# Patient Record
(demographics unavailable — no encounter records)

---

## 2017-03-22 NOTE — UC
Shoulder Pain HPI





- HPI Summary


HPI Summary: 





complaint of right shoulder pain that started approx 3 weeks ago


can't recall any trauma


 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


pain is in deltoid and shoots back into scalpula


pain is only elicited with movements


not taking any medications for pain








- History of Current Complaint


Chief Complaint: UCUpperExtremity


Stated Complaint: SHOULDER INJURY


Time Seen by Provider: 03/22/17 17:42


Hx Obtained From: Patient


Timing: Minutes


Character: Aching


Aggravating Factor(s): Lifting


Alleviating Factor(s): Rest





- Allergies/Home Medications


Allergies/Adverse Reactions: 


 Allergies











Allergy/AdvReac Type Severity Reaction Status Date / Time


 


No Known Allergies Allergy   Verified 03/22/17 17:11











Home Medications: 


 Home Medications





NK [No Home Medications Reported]  03/22/17 [History Confirmed 03/22/17]











PMH/Surg Hx/FS Hx/Imm Hx


Previously Healthy: Yes


Endocrine History Of: 


   Denies: Diabetes, Thyroid Disease


Cardiovascular History Of: Reports: Cardiac Disorders - HEART MURMUR


   Denies: Hypertension


Respiratory History Of: 


   Denies: COPD, Asthma


GI/ History Of: 


   Denies: Ulcer


Psychological History Of: Reports: Anxiety - hx of cutting





- Surgical History


Surgical History: Yes


Surgery Procedure, Year, and Place: right wrist





- Family History


Known Family History: Positive: Hypertension


   Negative: Cardiac Disease, Diabetes





- Social History


Occupation: Employed Full-time


Lives: With Family


Alcohol Use: Occasionally


Substance Use Type: Marijuana


Smoking Status (MU): Never Smoked Tobacco


Have You Smoked in the Last Year: No





Review of Systems


Constitutional: Negative


Skin: Negative


Eyes: Negative


ENT: Negative


Respiratory: Negative


Cardiovascular: Negative


Gastrointestinal: Negative


Genitourinary: Negative


Motor: Negative


Neurovascular: Negative


Musculoskeletal: Other: - rioght shoulder pain


Neurological: Negative


Psychological: Negative


All Other Systems Reviewed And Are Negative: Yes





Physical Exam


Triage Information Reviewed: Yes


Appearance: No Pain Distress, Well-Nourished


Vital Signs: 


 Initial Vital Signs











Temp  97.6 F   03/22/17 17:07


 


Pulse  50   03/22/17 17:07


 


Resp  12   03/22/17 17:07


 


BP  125/70   03/22/17 17:07


 


Pulse Ox  100   03/22/17 17:07











Vital Signs Reviewed: Yes


Eyes: Positive: Conjunctiva Clear


ENT: Positive: Normal ENT inspection


Neck: Positive: No Lymphadenopathy


Respiratory: Positive: Lungs clear, Normal breath sounds, No respiratory 

distress, No accessory muscle use


Cardiovascular: Positive: RRR, No Murmur, Pulses Normal


Musculoskeletal: Negative: Other: - RUE- shoulder full ROM of arm- pain 

elicitied in shoulder at 90 degrees no tenderness elicited from palpation of 

structures/musculature of shoulder negative for inpingment


Neurological: Positive: Alert


Psychological Exam: Normal


Skin Exam: Normal





Shoulder Course/Dx





- Differential Dx/Diagnosis


Differential Diagnosis/HQI/PQRI: Rotator Cuff Injury, Sprain, Strain


Provider Diagnoses: right shoulder pain





Discharge





- Discharge Plan


Condition: Stable


Disposition: HOME


Patient Education Materials:  Rotator Cuff Injury (ED), Shoulder Sprain (ED), 

RICE Therapy (ED)


Referrals: 


No Primary Care Phys,NOPCP [Primary Care Provider] - 


[facility] [place] [facility] [Outside]


Additional Instructions: 


Please call physical therapy for further evaluation and treatment.


Take ibuprofen for  pain and to reduce inflammation


rest your shoulder apply ice twice a day


 Increase fluids and rest. 


Please review your discharge instructions.


 If your symptoms do not improve please call your primary care provider or 

return to urgent care.

## 2017-05-25 NOTE — ED
margaux HUMMEL Timothy, scribed for Dustin Tapia MD on 05/25/17 at 0732 .





Lower Extremity





- HPI Summary


HPI Summary: 





Garry Trujillo III is a 26 yo male presenting to [facility] with 10/10 pain in his 

right ankle secondary to falling down stairs while intoxicated at 2300 last 

night. His pain increases with palpation or any movement. His MH includes heart 

murmur, self-harm, anxiety, substance abuse.





- History of Current Complaint


Chief Complaint: EDExtremityLower


Stated Complaint: RIGHT ANKLE INJURY


Time Seen by Provider: 05/25/17 07:23


Hx Obtained From: Patient


Mechanism Of Injury: Fall From A Standing Position - down stairs


Onset of Pain: Immediate


Onset/Duration: Hours


Pain Intensity: 10


Pain Scale Used: 0-10 Numeric


Location: Is Discrete @ - right ankle


Associated Signs And Symptoms: Positive: Swelling, Bruising


Aggravating Factor(s): Standing, Ambulation, Movement, Weight Bearing





- Allergies/Home Medications


Allergies/Adverse Reactions: 


 Allergies











Allergy/AdvReac Type Severity Reaction Status Date / Time


 


No Known Allergies Allergy   Verified 03/22/17 17:11














PMH/Surg Hx/FS Hx/Imm Hx


Endocrine/Hematology History: 


   Denies: Hx Diabetes, Hx Thyroid Disease


Cardiovascular History: 


   Denies: Hx Hypertension


Respiratory History: 


   Denies: Hx Asthma, Hx Chronic Obstructive Pulmonary Disease (COPD)


GI History: 


   Denies: Hx Ulcer


Psychiatric History: Reports: Hx Anxiety - hx of cutting, Hx Substance Abuse





- Cancer History


Cancer Type, Location and Year: anxiety





- Surgical History


Surgery Procedure, Year, and Place: right wrist


Infectious Disease History: No


Infectious Disease History: 


   Denies: Hx Clostridium Difficile, Hx Hepatitis, Hx Human Immunodeficiency 

Virus (HIV), Hx of Known/Suspected MRSA, Hx Shingles, Hx Tuberculosis, Hx Known/

Suspected VRE, Hx Known/Suspected VRSA, History Other Infectious Disease, 

Traveled Outside the [place] in Last 30 Days





- Family History


Known Family History: Positive: Hypertension


   Negative: Cardiac Disease, Diabetes





- Social History


Alcohol Use: Occasionally


Hx Substance Use: Yes


Substance Use Type: Reports: Marijuana


Smoking Status (MU): Never Smoked Tobacco


Have You Smoked in the Last Year: No





Review of Systems


Constitutional: Negative


Eyes: Negative


ENT: Negative


Cardiovascular: Negative


Respiratory: Negative


Gastrointestinal: Negative


Genitourinary: Negative


Musculoskeletal: Other - right ankle pain


Positive: Bruising - right ankle


Neurological: Negative


Psychological: Normal


All Other Systems Reviewed And Are Negative: Yes





Physical Exam





- Summary


Physical Exam Summary: 





VITAL SIGNS: Reviewed. 


GENERAL:  Patient is a well-developed and nourished male who is lying 

comfortable in the stretcher.  Patient is not in any acute respiratory 

distress. 


HEAD AND FACE: No signs of trauma.  No ecchymosis, hematomas or skull 

depressions. No sinus tenderness. 


EYES: PERRLA, EOMI x 2, No injected conjunctiva, no nystagmus.


EARS: Hearing grossly intact. Ear canals and tympanic membranes are within 

normal limits. 


MOUTH: Oropharynx within normal limits. 


NECK: Supple, trachea is midline, no adenopathy, no JVD, no carotid bruit, no c-

spine tenderness, neck with full ROM.


CHEST: Symmetric, no tenderness at palpation 


LUNGS: Clear to auscultation bilaterally. No wheezing or crackles.


CVS: Regular rate and rhythm, S1 and S2 present, no murmurs or gallops 

appreciated. 


ABDOMEN: Soft, non-tender. No signs of distention. No rebound no guarding, and 

no masses palpated. Bowel sounds are normal. 


EXTREMITIES: FROM in all major joints, no cyanosis or clubbing. In right ankle 

there is positive edema and ecchymosis with tenderness at the lateral malleolus


NEURO: Alert and oriented x 3. No acute neurological deficits. Speech is normal 

and follows commands.


SKIN: Dry and warm





Triage Information Reviewed: Yes


Vital Signs On Initial Exam: 


 Initial Vitals











Temp Pulse Resp BP Pulse Ox


 


 99.2 F   88   16   151/70   97 


 


 05/25/17 07:09  05/25/17 07:09  05/25/17 07:09  05/25/17 07:09  05/25/17 07:09











Vital Signs Reviewed: Yes





Procedures





- Procedure Summary


Procedure Summary: 





Cast was created and applied to RLE. Pt tolerated procedure well.





- Splinting


Location: RLE - ankle


Hand-Made Type: fiberglass


Splint: posterior


Pre-Proc Neuro Vasc Exam: normal


Post-Proc Neuro Vasc Exam: normal





Diagnostics





- Vital Signs


 Vital Signs











  Temp Pulse Resp BP Pulse Ox


 


 05/25/17 07:09  99.2 F  88  16  151/70  97














- Laboratory


Lab Statement: Any lab studies that have been ordered have been reviewed, and 

results considered in the medical decision making process.





- Radiology


  ** R Ankle XR


Xray Interpretation: Positive (See Comments) - IMPRESSION:  LONGITUDINALLY 

ORIENTED NONDISPLACED FRACTURE OF THE MEDIAL DISTAL TIBIA WITH ARTICULAR 

EXTENSION


Radiology Interpretation Completed By: Radiologist





  ** R Foot XR


Xray Interpretation: Positive (See Comments) - IMPRESSION:  AGAIN NOTED IS A 

NONDISPLACED FRACTURE OF THE MEDIAL DISTAL TIBIA


Radiology Interpretation Completed By: Radiologist





Re-Evaluation





- Re-Evaluation


  ** First Eval


Re-Evaluation Time: 08:19


Change: Unchanged


Comment: Discussed imaging results with Pt and necessity of a cast. Pt is 

agreeable to the course of Tx.





Lower Extremity Course/Dx





- Course


Assessment/Plan: Garry Trujillo III is a 26 yo male presenting to [facility] with 10

/10 R ankle pain after falling down the stairs at 2300 last night while 

intoxicated.  Ankle X ray impression: Nondisplaced fracture of the medial 

distal tibia.  Foot x ray impression: Longitudinal Nondisplaced fracture of the 

medial distal tibia with articular extension.  In the ED course he was given 

Toradol for the pain. Reports feeling better.  I place a posterior fibreglass 

splint. After splint patient is neurovascularly intact.  He will be given 

Crutches for a non weight bearing status. He will f/u with orthopedics.  I 

discussed all the findings and test results with the patient. Patient was 

instructed to return to the emergency room immediately if any of the symptoms 

return or worsens. Plan of care was discussed with the patient and understands 

and agrees. All questions were answered at patient satisfaction.  There were no 

further complaints or concerns.  Lung exam before discharge: CTA B/L. Good air 

exchange. No wheezing or crackles heard. CVS: S1 and S2 present. No murmurs 

appreciated. Patient is alert and oriented x 3. Patient is hemodynamically 

stable. Patient will be discharged home with follow up PCP in the next 2-3 days





- Diagnoses


Differential Diagnosis/HQI/PQRI: Positive: Fracture (Closed), Sprain, Strain


Provider Diagnoses: 


 Nondisplaced fracture of right tibia








Discharge





- Discharge Plan


Condition: Stable


Disposition: HOME


Patient Education Materials:  Leg Fracture (ED), Cast Care (ED)


Referrals: 


[facility] [place] [facility] [Outside] - 2 Days


Дмитрий Soria MD [Medical Doctor] - 2 Days


Additional Instructions: 


Please follow up with the primary care physician and the orthopedist provided 

regarding your visit to the emergency department today. Return to the emergency 

department with any new or recurring symptoms.





The documentation as recorded by the margaux downey Timothy accurately 

reflects the service I personally performed and the decisions made by me, Dustin Tapia MD.

## 2017-05-25 NOTE — RAD
HISTORY: Right ankle pain, trauma



COMPARISONS: None



VIEWS: There is, Frontal, lateral, and oblique views of the right ankle



FINDINGS:



BONE DENSITY: Normal.

BONES: There is a longitudinally oriented nondisplaced fracture of the medial distal

tibial metaphysis with articular extension    

JOINTS: There is no arthropathy.    

ALIGNMENT: There is no dislocation. 

SOFT TISSUES: There is soft tissue swelling



OTHER FINDINGS: None.



IMPRESSION: 

LONGITUDINALLY ORIENTED NONDISPLACED FRACTURE OF THE MEDIAL DISTAL TIBIA WITH ARTICULAR

EXTENSION

## 2017-05-25 NOTE — RAD
HISTORY: Ankle and foot pain, trauma to right ankle and foot



COMPARISONS: Right ankle dated May 25, 2017



VIEWS: 3, Frontal, lateral, and oblique views of the right foot



FINDINGS:



BONE DENSITY: Normal.

BONES: Again noted is a nondisplaced fracture of the medial tibia with articular extension

   

JOINTS: There is no arthropathy.    

ALIGNMENT: There is no dislocation. 

SOFT TISSUES: Unremarkable.



OTHER FINDINGS: None.



IMPRESSION: 

AGAIN NOTED IS A NONDISPLACED FRACTURE OF THE MEDIAL DISTAL TIBIA

## 2017-08-12 NOTE — UC
Regina HUMMEL Alfonso, scribed for Gm Dodd MD on 08/12/17 at 1952 .





 Dental HPI





- HPI Summary


HPI Summary: 





This patient is a 25 year old M presenting to [facility] with a chief complaint of 

lower right gum swelling since 3 days ago. He states it is swollen and it 

feels like something is in there. The patient rates the throbbing pain 10/10 

in severity. Symptoms aggravated and alleviated by nothing. Patient denies fever

, ear pain, face swelling, and neck swelling. He recently broke his tibia and 

has crutches. 





- History of Current Complaint


Chief Complaint: UCDentalProblem


Stated Complaint: DENTAL (GUM) ISSUE


Time Seen by Provider: 08/12/17 19:43


Hx Obtained From: Patient


Onset/Duration: Sudden Onset, Lasting Days - 3, Still Present


Severity: Moderate


Pain Intensity: 10


Pain Scale Used: 0-10 Numeric


Aggravating: Nothing


Alleviating: Nothing


Related History: Swelling





- Allergies/Home Medications


Allergies/Adverse Reactions: 


 Allergies











Allergy/AdvReac Type Severity Reaction Status Date / Time


 


No Known Allergies Allergy   Verified 03/22/17 17:11














PMH/Surg Hx/FS Hx/Imm Hx


Other Respiratory History: No Asthma.





- Surgical History


Surgical History: Yes


Surgery Procedure, Year, and Place: right wrist





- Family History


Known Family History: Positive: Hypertension


   Negative: Cardiac Disease, Diabetes





- Social History


Alcohol Use: None


Substance Use Type: None


Smoking Status (MU): Never Smoked Tobacco


Have You Smoked in the Last Year: No





Review of Systems


Constitutional: Other - Negative fever


ENT: Other - Positive right gum swelling; negative ear pain, face swelling, and 

neck swelling.


All Other Systems Reviewed And Are Negative: Yes





Physical Exam


Triage Information Reviewed: Yes


Vital Signs: 


 Initial Vital Signs











Temp  98.4 F   08/12/17 19:30


 


Pulse  69   08/12/17 19:30


 


Resp  18   08/12/17 19:30


 


BP  130/72   08/12/17 19:30


 


Pulse Ox  100   08/12/17 19:30











Vital Signs Reviewed: Yes





- Additional Comments





The patient is well-nourished in no acute distress and in no acute pain.





The skin is warm and dry and skin color reflects adequate perfusion.





HEENT:  The head is normocephalic and atraumatic. The pupils are equal and 

reactive. The conjunctivae are clear and without drainage.  Nares are patent 

and without drainage.  Mouth reveals moist mucous membranes and the throat is 

without erythema and exudate.  The external ears are intact. The ear canals are 

patent and without drainage. The tympanic membranes are intact.





Dental : Tender to percussion of the gum line. Behind tooth 32 is excess gum 

tissue. Area of erythematous gums looking like gingivitis.





Neck is supple with full range of motion and non-tender. There are no carotid 

bruits.  There is no neck vein distension.





Respiratory: Chest is non-tender.  Lungs are clear to auscultation and breath 

sounds are symmetrical and equal.





Cardiovascular: Heart is regular rate and rhythm.  There is no murmur or rub 

auscultated.   There is no peripheral edema and pulses are symmetrical and 

equal.





Abdomen: The abdomen is soft and non-tender.  There are normal bowel sounds 

heard in all four quadrants and there is no organomegaly palpated.





Musculoskeletal: There is no back pain noted.  Extremities are non-tender with 

full range of motion.  There is good capillary refill.  There is no peripheral 

edema or calf tenderness elicited.





Neurological: Patient is alert and oriented to person, place and time.  The 

patient has symmetrical motor strength in all four extremities.  Cranial nerves 

are grossly intact.





Psychiatric: The patient has an appropriate affect and does not exhibit any 

anxiety or depression.





Dental Complaint Course/Dx





- Course


Course Of Treatment: This patient is a 25 year old M presenting to [facility] with a 

chief complaint of lower right gum swelling since 3 days ago. He states it is 

swollen and it feels like something is in there. The patient rates the 

throbbing pain 10/10 in severity. Symptoms aggravated and alleviated by 

nothing. Patient denies fever, ear pain, face swelling, and neck swelling. He 

recently broke his tibia and has crutches. Patient will be discharged with 

prescription for Augmentin and follow up from PCP and dentist. The patient is 

agreeable with this plan.





- Differential Dx/Diagnosis


Differential Diagnosis/Dx: Dental Abscess, Gingivitis


Provider Diagnoses: Gingivitis





Discharge





- Discharge Plan


Condition: Stable


Disposition: HOME


Prescriptions: 


Amoxicillin/Clavulanate TAB* [Augmentin *] 875 mg PO BID #12 tab


Patient Education Materials:  Gingivitis (ED)


Referrals: 


Benny Kc MD [Primary Care Provider] - 3 Days


Additional Instructions: 


FOLLOW UP WITH A DENTIST IF YOUR SYMPTOMS DO NOT IMPROVE OR BECOME WORSE.





The documentation as recorded by the Regina downey Alfonso accurately reflects 

the service I personally performed and the decisions made by me, Gm Dodd MD.

## 2017-08-13 NOTE — ED
I, Oh,Sean, scribed for Toro Brownlee MD on 08/13/17 at 0259 .





Lower Extremity





- HPI Summary


HPI Summary: 





This 24 y/o male presents to ED for recurrent RLE leg pain tonight. PMHx is 

significant for RLE fracture s/p cast 6-7 days ago. He fell from standing 

position while ambulating with crutches, and decided to visit ED again when he 

became concerned about possible re-fracture of his RLE leg. He is able to 

tolerate ambulation with crutches, and seen ambulating to ED room. Other PMHx 

includes heart murmur. Pt is currently employed at [employer]. 





- History of Current Complaint


Chief Complaint: EDExtremityLower


Stated Complaint: FELL ON BROKEN RT LEG


Time Seen by Provider: 08/13/17 02:29


Hx Obtained From: Patient


Mechanism Of Injury: Fall From A Standing Position


Onset of Pain: Immediate


Pain Intensity: 5


Pain Scale Used: 0-10 Numeric


Location: Is Discrete @ - RLE leg


Character Of Pain: Dull


Associated Signs And Symptoms: Positive: Negative


Aggravating Factor(s): Standing, Ambulation


Alleviating Factor(s): Rest





- Allergies/Home Medications


Allergies/Adverse Reactions: 


 Allergies











Allergy/AdvReac Type Severity Reaction Status Date / Time


 


No Known Allergies Allergy   Verified 08/13/17 02:14














PMH/Surg Hx/FS Hx/Imm Hx


Endocrine/Hematology History: 


   Denies: Hx Diabetes, Hx Thyroid Disease


Cardiovascular History: 


   Denies: Hx Hypertension


Respiratory History: 


   Denies: Hx Asthma, Hx Chronic Obstructive Pulmonary Disease (COPD)


GI History: 


   Denies: Hx Ulcer


Psychiatric History: Reports: Hx Anxiety - hx of cutting, Hx Substance Abuse





- Cancer History


Cancer Type, Location and Year: anxiety





- Surgical History


Surgery Procedure, Year, and Place: right wrist


Infectious Disease History: No


Infectious Disease History: 


   Denies: Hx Clostridium Difficile, Hx Hepatitis, Hx Human Immunodeficiency 

Virus (HIV), Hx of Known/Suspected MRSA, Hx Shingles, Hx Tuberculosis, Hx Known/

Suspected VRE, Hx Known/Suspected VRSA, History Other Infectious Disease, 

Traveled Outside the [place] in Last 30 Days





- Family History


Known Family History: Positive: Hypertension


   Negative: Cardiac Disease, Diabetes





- Social History


Alcohol Use: Weekly


Hx Substance Use: Yes


Substance Use Type: Reports: None


Smoking Status (MU): Never Smoked Tobacco


Have You Smoked in the Last Year: No





Review of Systems


Negative: Fever


Positive: Other - RLE pain, currently casted


All Other Systems Reviewed And Are Negative: Yes





Physical Exam


Triage Information Reviewed: Yes


Vital Signs On Initial Exam: 


 Initial Vitals











Temp Pulse Resp BP Pulse Ox


 


 98.2 F   107   18   134/82   95 


 


 08/13/17 02:16  08/13/17 02:16  08/13/17 02:16  08/13/17 02:16  08/13/17 02:16











Vital Signs Reviewed: Yes


Appearance: Positive: Well-Appearing, No Pain Distress


Skin: Positive: Warm


Head/Face: Positive: Normal Head/Face Inspection


Eyes: Positive: ZEB


ENT: Positive: Hearing grossly normal


Neck: Positive: Supple


Respiratory/Lung Sounds: Positive: Breath Sounds Present


Cardiovascular: Positive: RRR


Abdomen Description: Positive: Nontender, Soft


Bowel Sounds: Positive: Present


Musculoskeletal: Positive: Strength/ROM Intact


Neurological: Positive: Alert, Oriented to Person Place, Time


Psychiatric: Positive: Affect/Mood Appropriate





Diagnostics





- Vital Signs


 Vital Signs











  Temp Pulse Resp BP Pulse Ox


 


 08/13/17 02:16  98.2 F  107  18  134/82  95














- Laboratory


Lab Statement: Any lab studies that have been ordered have been reviewed, and 

results considered in the medical decision making process.





- Radiology


  ** RLE leg


Xray Interpretation: No Acute Changes


Radiology Interpretation Completed By: ED Physician





Re-Evaluation





- Re-Evaluation


  ** First Eval


Change: Improved





Lower Extremity Course/Dx





- Diagnoses


Provider Diagnoses: 


 Right leg pain








Discharge





- Discharge Plan


Condition: Stable


Disposition: HOME


Prescriptions: 


Naproxen [Naprosyn 500 mg] 500 mg PO BID #20 tab


Patient Education Materials:  Naproxen (By mouth), Leg Pain (ED)


Referrals: 


Benny Kc MD [Primary Care Provider] - 2 Days





The documentation as recorded by the Hang downey Soohyun accurately reflects the 

service I personally performed and the decisions made by me, Toro Brownlee MD.

## 2017-08-13 NOTE — RAD
Indication: Right lower extremity pain.



2 views of the right lower extremity demonstrates no fracture. No other bone or joint

abnormality is noted.



IMPRESSION: No fracture of the right lower extremity is noted.